# Patient Record
Sex: MALE | Race: WHITE | URBAN - METROPOLITAN AREA
[De-identification: names, ages, dates, MRNs, and addresses within clinical notes are randomized per-mention and may not be internally consistent; named-entity substitution may affect disease eponyms.]

---

## 2019-08-22 ENCOUNTER — ANCILLARY PROCEDURE (OUTPATIENT)
Dept: GENERAL RADIOLOGY | Facility: CLINIC | Age: 49
End: 2019-08-22
Attending: FAMILY MEDICINE

## 2019-08-22 ENCOUNTER — OFFICE VISIT (OUTPATIENT)
Dept: ORTHOPEDICS | Facility: CLINIC | Age: 49
End: 2019-08-22

## 2019-08-22 VITALS
WEIGHT: 173 LBS | DIASTOLIC BLOOD PRESSURE: 76 MMHG | HEIGHT: 70 IN | SYSTOLIC BLOOD PRESSURE: 110 MMHG | BODY MASS INDEX: 24.77 KG/M2

## 2019-08-22 DIAGNOSIS — M79.645 PAIN OF LEFT THUMB: ICD-10-CM

## 2019-08-22 DIAGNOSIS — M79.645 PAIN OF LEFT THUMB: Primary | ICD-10-CM

## 2019-08-22 PROCEDURE — 73140 X-RAY EXAM OF FINGER(S): CPT | Mod: LT

## 2019-08-22 PROCEDURE — 99203 OFFICE O/P NEW LOW 30 MIN: CPT | Performed by: FAMILY MEDICINE

## 2019-08-22 ASSESSMENT — MIFFLIN-ST. JEOR: SCORE: 1657.22

## 2019-08-22 NOTE — PATIENT INSTRUCTIONS
Patient Education     Closed Thumb Fracture  You have a broken (fractured) thumb. This causes local pain, swelling, and often bruising. This injury will usually take about 4 to 6 weeks or longer to heal. Thumb fractures may be treated with a splint or cast. This protects the thumb and holds the bone in place while it heals. More serious fractures may need surgery.     If the thumbnail has been severely injured, it may fall off in 1 to 2 weeks. A new thumbnail will usually start to grow back within a month.  Home care  Follow these guidelines when caring for yourself at home:    Keep your arm elevated to reduce pain and swelling. When sitting or lying down elevate your arm above the level of your heart. You can do this by placing your arm on a pillow that rests on your chest or on a pillow at your side. This is most important during the first 2 days (48 hours) after the injury.    Put an ice pack on the injured area. Do this for 20 minutes every 1 to 2 hours the first day for pain relief. You can make an ice pack by wrapping a plastic bag of ice cubes in a thin towel. As the ice melts, be careful that the cast or splint doesn t get wet. Continue using the ice pack 3 to 4 times a day for the next 2 days. Then use the ice pack as needed to ease pain and swelling.    If a splint was put on, leave this in place for the time advised. This will keep the bones from moving out of position.    Keep the cast or splint completely dry at all times. Bathe with your cast or splint out of the water. Protect it with a large plastic bag, rubber-banded at the top end. If a fiberglass cast or splint gets wet, you can dry it with a hair dryer.    You may use acetaminophen or ibuprofen to control pain, unless another pain medicine was prescribed. If you have chronic liver or kidney disease, talk with your healthcare provider before using these medicines. Also talk with your provider if you ve had a stomach ulcer or gastrointestinal  bleeding.    Don t put creams or objects under the cast if you have itching.  Follow-up care  Follow up with your healthcare provider in 1 week, or as advised. This is to make sure the bone is healing the way it should. Talk with your provider about when it is safe to return to sports or work.  X-rays may be taken. You will be told of any new findings that may affect your care.  When to seek medical advice  Call your healthcare provider right away if any of these occur:    The cast or splint cracks    The plaster cast or splint becomes wet or soft    The fiberglass cast or splint stays wet for more than 24 hours    Bad odor from the cast or wound fluid stains the cast    Pain or swelling gets worse    Redness or warmth in the hand    Fingers or hand become cold, blue, numb, or tingly    You can t move your hand or fingers    Skin around cast or splint becomes red    Fever of 100.4 F (38 C) or higher, or as directed by your healthcare provider  Date Last Reviewed: 2/1/2017 2000-2018 [employer]. [address] [place] [address]. All rights reserved. This information is not intended as a substitute for professional medical care. Always follow your healthcare professional's instructions.         Diagnosis: Left thumb fracture  Image Findings: non-displaced avulsion fracture  Treatment: thumb brace, game keepers thumb brace  Medications: Limited tylenol/ibuprofen for pain for 1-2 weeks  Follow-up: 1 week for repeat evaluation and imaging    It was great seeing you today!    Andrea Barrera

## 2019-08-22 NOTE — LETTER
8/22/2019         RE: Deisy Huerta  [address]  [place] [address]        Dear Colleague,    Thank you for referring your patient, Deisy Huerta, to the [facility]. Please see a copy of my visit note below.    ASSESSMENT & PLAN  Deisy was seen today for musculoskeletal problem.    Diagnoses and all orders for this visit:    Pain of left thumb  -     XR Finger Left G/E 2 Views; Future      Patient is a 48 year old male presenting for evaluation of   Chief Complaint   Patient presents with     Musculoskeletal Problem     left thumb      Left Thumb Injury: Direct impact from branch, TTP over ulnar side of 1st digit MCP with XR showing possible small avulsion fx.  Mild laxity on UCL testing.  Plna to brace and f/u in one week.  Pt understands and agrees with plan   Treatment: gamekeepers brace  Physical Therapy none  Injection none  Medications  Limited NSAIDs/Tylenol    Concerning signs/sx that would warrant urgent evaluation were discussed.  All questions were answered, patient understands and agrees with plan.      Return in about 1 week (around 8/29/2019).    -----    SUBJECTIVE  Deisy Huerta is a/an 48 year old Right handed male who is seen as an [facility] patient for evaluation of left Thumb pain. The patient is seen by themselves.    Onset: 1 day(s) ago. Patient describes injury as trimming a tree and a tree branch fell onto his thumb  Location of Pain: left thumb - MCP joint  Rating of Pain at worst: 10/10  Rating of Pain Currently: 5/10  Worsened by: moving the thumb, gripping  Better with: not moving the thumb  Treatments tried: ice and Tylenol  Associated symptoms: swelling and weakness of th thumb  Orthopedic history: NO  Relevant surgical history: NO  Patient Social History: works doing art therapy with brain disorders.     Patient's past medical, surgical, social, and family histories were reviewed today and no changes are noted.  No family history  "pertinent to the patient's problem today      REVIEW OF SYSTEMS:  10 point ROS is negative other than symptoms noted above in HPI, Past Medical History or as stated below  Constitutional: NEGATIVE for fever, chills, change in weight  Skin: NEGATIVE for worrisome rashes, moles or lesions  GI/: NEGATIVE for bowel or bladder changes  Neuro: NEGATIVE for weakness, dizziness or paresthesias    OBJECTIVE:  /76   Ht 1.78 m (5' 10.08\")   Wt 78.5 kg (173 lb)   BMI 24.77 kg/m      General: healthy, alert and in no distress  HEENT: no scleral icterus or conjunctival erythema  Skin: no suspicious lesions or rash. No jaundice.  CV: regular rhythm by palpation  Resp: normal respiratory effort without conversational dyspnea   Psych: normal mood and affect  Gait: normal steady gait with appropriate coordination and balance  Neuro: Normal sensory exam of bilateral hands. Normal 2 pt discrimination.   MSK:  LEFT WRIST  Inspection:    No swelling or obvious deformity or asymmetry  Palpation:    Tender about the  1st ulnar side of MCP. Remainder of bony and ligamentous line marks are nontender.     Metacarpals: normal    Thumb: MCP joint    Fingers: normal  Range of Motion:    Limited thumb flex/ext 2/2 pain otherwise Full (active and passive) flexion, extension, pronation/supination, and ulnar/radial deviation.  Strength:    Pain with thumb flex/ext otherwise No deficits in flexion, extension, ulnar/radial deviation, or  strength.. Normal pinch strength.  Special Tests:    Positive: Pain with UCL testing, mild laxity compared to c/l side    Negative: thenar eminence wasting, hypothenar eminence wasting.     Independent visualization of the below image:  Recent Results (from the past 24 hour(s))   XR Finger Left G/E 2 Views    Narrative    LEFT FINGER TWO OR MORE VIEWS   8/22/2019 10:36 AM     HISTORY: Pain of left thumb.    COMPARISON: None.      Impression    IMPRESSION: Bone mineralization and alignment is normal. No " fracture.    KARELY BEASLEY MD       I ordered, visualized and reviewed these images with the patient  Possible avulsion fx over ulnar side of MCP    Patient's conditions were thoroughly discussed during today's visit with greater than 50% of the visit spent counseling the patient with total time spent face-to-face with the patient being 20 minutes.    Andrea Barrera MD [facility]      Again, thank you for allowing me to participate in the care of your patient.        Sincerely,        Andrea Barrera MD

## 2019-08-22 NOTE — PROGRESS NOTES
ASSESSMENT & PLAN  Deisy was seen today for musculoskeletal problem.    Diagnoses and all orders for this visit:    Pain of left thumb  -     XR Finger Left G/E 2 Views; Future      Patient is a 48 year old male presenting for evaluation of   Chief Complaint   Patient presents with     Musculoskeletal Problem     left thumb      Left Thumb Injury: Direct impact from branch, TTP over ulnar side of 1st digit MCP with XR showing possible small avulsion fx.  Mild laxity on UCL testing.  Plna to brace and f/u in one week.  Pt understands and agrees with plan   Treatment: gamekeepers brace  Physical Therapy none  Injection none  Medications  Limited NSAIDs/Tylenol    Concerning signs/sx that would warrant urgent evaluation were discussed.  All questions were answered, patient understands and agrees with plan.      Return in about 1 week (around 8/29/2019).    -----    SUBJECTIVE  Deisy Huerta is a/an 48 year old Right handed male who is seen as an [facility] patient for evaluation of left Thumb pain. The patient is seen by themselves.    Onset: 1 day(s) ago. Patient describes injury as trimming a tree and a tree branch fell onto his thumb  Location of Pain: left thumb - MCP joint  Rating of Pain at worst: 10/10  Rating of Pain Currently: 5/10  Worsened by: moving the thumb, gripping  Better with: not moving the thumb  Treatments tried: ice and Tylenol  Associated symptoms: swelling and weakness of th thumb  Orthopedic history: NO  Relevant surgical history: NO  Patient Social History: works doing art therapy with brain disorders.     Patient's past medical, surgical, social, and family histories were reviewed today and no changes are noted.  No family history pertinent to the patient's problem today      REVIEW OF SYSTEMS:  10 point ROS is negative other than symptoms noted above in HPI, Past Medical History or as stated below  Constitutional: NEGATIVE for fever, chills, change in weight  Skin: NEGATIVE for worrisome  "rashes, moles or lesions  GI/: NEGATIVE for bowel or bladder changes  Neuro: NEGATIVE for weakness, dizziness or paresthesias    OBJECTIVE:  /76   Ht 1.78 m (5' 10.08\")   Wt 78.5 kg (173 lb)   BMI 24.77 kg/m     General: healthy, alert and in no distress  HEENT: no scleral icterus or conjunctival erythema  Skin: no suspicious lesions or rash. No jaundice.  CV: regular rhythm by palpation  Resp: normal respiratory effort without conversational dyspnea   Psych: normal mood and affect  Gait: normal steady gait with appropriate coordination and balance  Neuro: Normal sensory exam of bilateral hands. Normal 2 pt discrimination.   MSK:  LEFT WRIST  Inspection:    No swelling or obvious deformity or asymmetry  Palpation:    Tender about the  1st ulnar side of MCP. Remainder of bony and ligamentous line marks are nontender.     Metacarpals: normal    Thumb: MCP joint    Fingers: normal  Range of Motion:    Limited thumb flex/ext 2/2 pain otherwise Full (active and passive) flexion, extension, pronation/supination, and ulnar/radial deviation.  Strength:    Pain with thumb flex/ext otherwise No deficits in flexion, extension, ulnar/radial deviation, or  strength.. Normal pinch strength.  Special Tests:    Positive: Pain with UCL testing, mild laxity compared to c/l side    Negative: thenar eminence wasting, hypothenar eminence wasting.     Independent visualization of the below image:  Recent Results (from the past 24 hour(s))   XR Finger Left G/E 2 Views    Narrative    LEFT FINGER TWO OR MORE VIEWS   8/22/2019 10:36 AM     HISTORY: Pain of left thumb.    COMPARISON: None.      Impression    IMPRESSION: Bone mineralization and alignment is normal. No fracture.    KARELY BEASLEY MD       I ordered, visualized and reviewed these images with the patient  Possible avulsion fx over ulnar side of MCP    Patient's conditions were thoroughly discussed during today's visit with greater than 50% of the visit spent " counseling the patient with total time spent face-to-face with the patient being 20 minutes.    Andrea Barrera MD [facility]

## 2019-10-30 ENCOUNTER — HEALTH MAINTENANCE LETTER (OUTPATIENT)
Age: 49
End: 2019-10-30

## 2021-01-15 ENCOUNTER — HEALTH MAINTENANCE LETTER (OUTPATIENT)
Age: 51
End: 2021-01-15

## 2021-09-04 ENCOUNTER — HEALTH MAINTENANCE LETTER (OUTPATIENT)
Age: 51
End: 2021-09-04

## 2022-02-19 ENCOUNTER — HEALTH MAINTENANCE LETTER (OUTPATIENT)
Age: 52
End: 2022-02-19

## 2022-10-22 ENCOUNTER — HEALTH MAINTENANCE LETTER (OUTPATIENT)
Age: 52
End: 2022-10-22

## 2023-04-01 ENCOUNTER — HEALTH MAINTENANCE LETTER (OUTPATIENT)
Age: 53
End: 2023-04-01